# Patient Record
Sex: FEMALE | Race: WHITE | NOT HISPANIC OR LATINO | URBAN - METROPOLITAN AREA
[De-identification: names, ages, dates, MRNs, and addresses within clinical notes are randomized per-mention and may not be internally consistent; named-entity substitution may affect disease eponyms.]

---

## 2024-10-26 ENCOUNTER — OFFICE VISIT (OUTPATIENT)
Dept: URGENT CARE | Age: 39
End: 2024-10-26
Payer: COMMERCIAL

## 2024-10-26 VITALS
DIASTOLIC BLOOD PRESSURE: 88 MMHG | TEMPERATURE: 99.2 F | HEIGHT: 65 IN | SYSTOLIC BLOOD PRESSURE: 121 MMHG | WEIGHT: 141 LBS | BODY MASS INDEX: 23.49 KG/M2 | RESPIRATION RATE: 18 BRPM | HEART RATE: 78 BPM | OXYGEN SATURATION: 98 %

## 2024-10-26 DIAGNOSIS — R51.9 ACUTE NONINTRACTABLE HEADACHE, UNSPECIFIED HEADACHE TYPE: ICD-10-CM

## 2024-10-26 DIAGNOSIS — H81.10 BENIGN PAROXYSMAL POSITIONAL VERTIGO, UNSPECIFIED LATERALITY: Primary | ICD-10-CM

## 2024-10-26 RX ORDER — MECLIZINE HYDROCHLORIDE 25 MG/1
25 TABLET ORAL 3 TIMES DAILY PRN
Qty: 30 TABLET | Refills: 0 | Status: SHIPPED | OUTPATIENT
Start: 2024-10-26 | End: 2025-10-26

## 2024-10-26 RX ORDER — LISDEXAMFETAMINE DIMESYLATE 50 MG/1
1 CAPSULE ORAL
COMMUNITY
Start: 2024-10-10

## 2024-10-26 RX ORDER — DULOXETIN HYDROCHLORIDE 30 MG/1
30 CAPSULE, DELAYED RELEASE ORAL
COMMUNITY
Start: 2021-11-08

## 2024-10-26 ASSESSMENT — PAIN SCALES - GENERAL: PAINLEVEL_OUTOF10: 3

## 2024-10-26 NOTE — PATIENT INSTRUCTIONS
Take medication as directed   Tylenol or ibuprofen as needed for headache  Avoid operating motor vehicle, climbing ladders or doing other activities or dizziness would be detrimental  See PCP in 5 to 7 days if not resolving  Go to ER for high fevers or worsening symptoms

## 2024-10-26 NOTE — PROGRESS NOTES
"Subjective   Patient ID: Torri South is a 39 y.o. female. They present today with a chief complaint of Vertigo.    History of Present Illness  HPI  39-year-old female presents with 5 days of vertigo and headache.  She states that she recently had a upper respiratory cold which seem to be resolving.  No recent head trauma.  She denies any hearing problems or earaches.  No sore throat.  She states the vertigo is elicited with quick movements of her head.  She denies any loss of consciousness.  No vomiting.  No peripheral weakness.  No chest pains or shortness of breath.  Past Medical History  Allergies as of 10/26/2024    (No Known Allergies)       (Not in a hospital admission)       Past Medical History:   Diagnosis Date    Other specified health status     No pertinent past medical history    Otitis media, unspecified, left ear 07/22/2022    Left otitis media       No past surgical history on file.     reports that she has never smoked. She has never used smokeless tobacco. She reports that she does not currently use alcohol.    Review of Systems  Review of Systems as in history of present illness                               Objective    Vitals:    10/26/24 1233   BP: 121/88   Pulse: 78   Resp: 18   Temp: 37.3 °C (99.2 °F)   TempSrc: Oral   SpO2: 98%   Weight: 64 kg (141 lb)   Height: 1.638 m (5' 4.5\")     No LMP recorded.    Physical Exam  General: Vitals noted, no distress. Afebrile.   EENT: TMs clear. Posterior oropharynx unremarkable.  Tympanic membranes and ear canals appear normal with no effusion or erythema.  Eyes: EOMI, PERRL, no nystagmus  Cardiac: Regular, rate, rhythm, no murmur.   Extremities: No peripheral edema. Negative Homans bilaterally, no cords.   Skin: No rash.   Neuro: No focal neurologic deficits, NIH score of 0.    Procedures    Point of Care Test & Imaging Results from this visit  No results found for this visit on 10/26/24.   No results found.    Diagnostic study results (if any) were " reviewed by Kahlil Gonzalez MD.    Assessment/Plan   Allergies, medications, history, and pertinent labs/EKGs/Imaging reviewed by Kahlil Gonzalez MD.     Medical Decision Making  At time of discharge patient was clinically well-appearing and HDS for outpatient management. The patient and/or family was educated regarding diagnosis, supportive care, OTC and Rx medications. The patient and/or family was given the opportunity to ask questions prior to discharge.  They verbalized understanding of my discussion of the plans for treatment, expected course, indications to return to  or seek further evaluation in ED, and the need for timely follow up as directed.   They were provided with a work/school excuse if requested.    Orders and Diagnoses  Diagnoses and all orders for this visit:  Benign paroxysmal positional vertigo, unspecified laterality  -     meclizine (Antivert) 25 mg tablet; Take 1 tablet (25 mg) by mouth 3 times a day as needed for dizziness.  Acute nonintractable headache, unspecified headache type      Medical Admin Record      Patient disposition: Home    Electronically signed by Kahlil Gonzalez MD  12:51 PM

## 2025-02-13 ENCOUNTER — OFFICE VISIT (OUTPATIENT)
Dept: URGENT CARE | Age: 40
End: 2025-02-13
Payer: COMMERCIAL

## 2025-02-13 VITALS
SYSTOLIC BLOOD PRESSURE: 115 MMHG | RESPIRATION RATE: 20 BRPM | HEART RATE: 100 BPM | TEMPERATURE: 97.6 F | DIASTOLIC BLOOD PRESSURE: 74 MMHG | OXYGEN SATURATION: 97 %

## 2025-02-13 DIAGNOSIS — H69.90 EUSTACHIAN TUBE DISORDER, UNSPECIFIED LATERALITY: Primary | ICD-10-CM

## 2025-02-13 RX ORDER — BENZONATATE 200 MG/1
200 CAPSULE ORAL 3 TIMES DAILY PRN
Qty: 21 CAPSULE | Refills: 0 | Status: SHIPPED | OUTPATIENT
Start: 2025-02-13 | End: 2025-03-15

## 2025-02-13 RX ORDER — LORATADINE AND PSEUDOEPHEDRINE SULFATE 5; 120 MG/1; MG/1
1 TABLET, EXTENDED RELEASE ORAL 2 TIMES DAILY
Qty: 20 TABLET | Refills: 0 | Status: SHIPPED | OUTPATIENT
Start: 2025-02-13 | End: 2026-02-13

## 2025-02-13 RX ORDER — AMOXICILLIN AND CLAVULANATE POTASSIUM 875; 125 MG/1; MG/1
875 TABLET, FILM COATED ORAL 2 TIMES DAILY
Qty: 20 TABLET | Refills: 0 | Status: SHIPPED | OUTPATIENT
Start: 2025-02-13

## 2025-02-13 RX ORDER — IPRATROPIUM BROMIDE 42 UG/1
2 SPRAY, METERED NASAL 3 TIMES DAILY
Qty: 15 ML | Refills: 0 | Status: SHIPPED | OUTPATIENT
Start: 2025-02-13 | End: 2025-02-18

## 2025-02-13 ASSESSMENT — ENCOUNTER SYMPTOMS: COUGH: 1

## 2025-02-14 NOTE — PROGRESS NOTES
Subjective   Patient ID: Torri South is a 39 y.o. female. They present today with a chief complaint of Cough (X 10 days) and Earache (X 2 days).    History of Present Illness  39-year-old female here with 7 days of initially URI symptoms for the last 3-5 days increased left ear pain and pressure.  No fever mild chills.  Has been both runny and stuffy.  Some headache.  Occasional cough.  Some nausea but no vomiting diarrhea.  She has been using Advil DayQuil and NyQuil without much relief.  2 of her children been diagnosed with otitis media.      Cough  Associated symptoms include ear pain.   Earache   Associated symptoms include coughing.       Past Medical History  Allergies as of 02/13/2025    (No Known Allergies)       (Not in a hospital admission)       Past Medical History:   Diagnosis Date    Other specified health status     No pertinent past medical history    Otitis media, unspecified, left ear 07/22/2022    Left otitis media       No past surgical history on file.     reports that she has never smoked. She has never used smokeless tobacco. She reports that she does not currently use alcohol.    Review of Systems  Review of Systems   HENT:  Positive for ear pain.    Respiratory:  Positive for cough.                                   Objective    Vitals:    02/13/25 1925   BP: 115/74   Pulse: 100   Resp: 20   Temp: 36.4 °C (97.6 °F)   TempSrc: Temporal   SpO2: 97%     No LMP recorded.    Physical Exam    General- No apparent distress, well appearing  Cardiovascular- regular rate and rhythm, no gallops, murmurs or rubs  Lungs- clear to auscultation bilaterally, no wheezing or rhonchi  ENT- Normal right tympanic membranes, normal pharynx, moderate clear fluid behind left tympanic membrane  Skin- no rashes noted      Procedures    Point of Care Test & Imaging Results from this visit  No results found for this visit on 02/13/25.   No results found.    Diagnostic study results (if any) were reviewed by Rodriguez  EARNEST Maldonado MD.    Assessment/Plan   Allergies, medications, history, and pertinent labs/EKGs/Imaging reviewed by Rodriguez Maldonado MD.     Medical Decision Making  1.  Left otalgia-suspect eustachian tube dysfunction.  Flonase, Claritin-D, Tessalon.  If not improved in 48 hours start Augmentin for possible serous otitis    Orders and Diagnoses  There are no diagnoses linked to this encounter.    Medical Admin Record      Patient disposition: Home    Electronically signed by Rodriguez Maldonado MD  7:37 PM

## 2025-06-28 ENCOUNTER — OFFICE VISIT (OUTPATIENT)
Dept: URGENT CARE | Age: 40
End: 2025-06-28
Payer: COMMERCIAL

## 2025-06-28 VITALS
HEIGHT: 64 IN | HEART RATE: 90 BPM | SYSTOLIC BLOOD PRESSURE: 120 MMHG | RESPIRATION RATE: 19 BRPM | BODY MASS INDEX: 24.59 KG/M2 | DIASTOLIC BLOOD PRESSURE: 84 MMHG | WEIGHT: 144 LBS | TEMPERATURE: 99.6 F | OXYGEN SATURATION: 97 %

## 2025-06-28 DIAGNOSIS — S39.012A LUMBAR STRAIN, INITIAL ENCOUNTER: Primary | ICD-10-CM

## 2025-06-28 PROBLEM — F41.1 GENERALIZED ANXIETY DISORDER: Status: ACTIVE | Noted: 2024-12-05

## 2025-06-28 PROBLEM — F41.9 ANXIETY: Status: ACTIVE | Noted: 2022-07-26

## 2025-06-28 PROBLEM — F33.41 RECURRENT MAJOR DEPRESSIVE DISORDER, IN PARTIAL REMISSION: Status: ACTIVE | Noted: 2024-12-05

## 2025-06-28 PROBLEM — F90.9 ATTENTION DEFICIT HYPERACTIVITY DISORDER (ADHD): Status: ACTIVE | Noted: 2022-07-26

## 2025-06-28 RX ORDER — KETOROLAC TROMETHAMINE 30 MG/ML
30 INJECTION, SOLUTION INTRAMUSCULAR; INTRAVENOUS ONCE
Status: COMPLETED | OUTPATIENT
Start: 2025-06-28 | End: 2025-06-28

## 2025-06-28 RX ORDER — DULOXETIN HYDROCHLORIDE 20 MG/1
1 CAPSULE, DELAYED RELEASE ORAL
COMMUNITY
Start: 2025-04-14

## 2025-06-28 RX ORDER — LISDEXAMFETAMINE DIMESYLATE 60 MG/1
60 CAPSULE ORAL
COMMUNITY
Start: 2025-06-21

## 2025-06-28 RX ORDER — PREDNISONE 20 MG/1
TABLET ORAL
Qty: 10 TABLET | Refills: 0 | Status: SHIPPED | OUTPATIENT
Start: 2025-06-28 | End: 2025-07-07

## 2025-06-28 RX ORDER — PREDNISONE 20 MG/1
TABLET ORAL
Qty: 10 TABLET | Refills: 0 | Status: SHIPPED | OUTPATIENT
Start: 2025-06-28 | End: 2025-06-28 | Stop reason: SDUPTHER

## 2025-06-28 RX ORDER — CYCLOBENZAPRINE HCL 10 MG
5 TABLET ORAL 3 TIMES DAILY
Qty: 20 TABLET | Refills: 0 | Status: SHIPPED | OUTPATIENT
Start: 2025-06-28 | End: 2025-06-28

## 2025-06-28 RX ORDER — CYCLOBENZAPRINE HCL 10 MG
5 TABLET ORAL 3 TIMES DAILY
Qty: 20 TABLET | Refills: 0 | Status: SHIPPED | OUTPATIENT
Start: 2025-06-28 | End: 2025-07-08

## 2025-06-28 RX ADMIN — KETOROLAC TROMETHAMINE 30 MG: 30 INJECTION, SOLUTION INTRAMUSCULAR; INTRAVENOUS at 19:04

## 2025-06-28 ASSESSMENT — PAIN SCALES - GENERAL
PAINLEVEL_OUTOF10: 9
PAINLEVEL_OUTOF10: 7

## 2025-06-28 NOTE — PATIENT INSTRUCTIONS
You were seen today for a flare-up of chronic low back pain with sciatica. A Toradol injection was given in the clinic to help reduce inflammation and provide short-term pain relief.  Medications:  Prednisone: Take as prescribed to reduce inflammation.  Flexeril (Cyclobenzaprine): Take as directed for muscle relaxation, typically at bedtime due to possible drowsiness.  Use over-the-counter ibuprofen or acetaminophen as needed for additional pain control, unless otherwise directed.  Activity:  Rest as needed but avoid prolonged bed rest.  Use gentle stretching and gradual return to activity as tolerated.  Avoid heavy lifting, twisting, or prolonged sitting.  Use heat or ice packs to help manage pain (20 minutes at a time, a few times a day).  Follow-Up:  Continue follow-up with your spine specialist as scheduled.  If symptoms persist or worsen, return to clinic or seek medical attention.  Return If You Develop:  New or worsening numbness, tingling, or weakness in the legs  Difficulty walking or standing  Loss of bowel or bladder control  Severe or worsening pain not relieved by medications

## 2025-06-28 NOTE — PROGRESS NOTES
"Subjective   Patient ID: Torri South is a 40 y.o. female. They present today with a chief complaint of Back Pain.    History of Present Illness  40-year-old female presents with exacerbation of chronic low back pain. Reports history of lumbago since 2004, typically managed with chiropractic care. Currently awaiting evaluation by a spine specialist. This is her third flare in the past 4 months. Today, the pain became constant and severe, located primarily in the left lower back and radiating down the left thigh. She reports worsening with bending and prolonged standing. Took one of her spouse's 5 mg oxycodone earlier today with minimal relief. Denies numbness, tingling, bowel or bladder incontinence, saddle anesthesia, recent trauma, fever, or weakness.        Past Medical History  Allergies as of 06/28/2025    (No Known Allergies)       Prescriptions Prior to Admission[1]     Medical History[2]    Surgical History[3]     reports that she has quit smoking. Her smoking use included cigarettes. She has never used smokeless tobacco. She reports current alcohol use.    Review of Systems  Review of Systems   All other systems reviewed and are negative.  General: No fever, chills, or weight loss.    Musculoskeletal: Positive for low back pain radiating to the left thigh. Denies joint swelling or stiffness elsewhere.    Neurological: Denies numbness, tingling, weakness, or bowel/bladder incontinence.    Cardiovascular/Respiratory: Denies chest pain, palpitations, shortness of breath.    Gastrointestinal: Denies abdominal pain, nausea, vomiting.    Genitourinary: Denies urinary retention or incontinence.    Objective    Vitals:    06/28/25 1833   BP: 123/90   Pulse: 88   Resp: 20   Temp: 37.6 °C (99.6 °F)   TempSrc: Oral   SpO2: 98%   Weight: 65.3 kg (144 lb)   Height: 1.626 m (5' 4\")     No LMP recorded.    Physical Exam  Vitals and nursing note reviewed.     General: Alert, in mild discomfort due to back " pain.    Spine: Tenderness over the left lower lumbar paraspinal muscles. No midline tenderness or step-offs.  Range of Motion: Decreased lumbar flexion and extension due to pain.  Neurological:  Motor: 5/5 strength in bilateral lower extremities.  Sensation: Intact to light touch and pinprick in bilateral lower extremities.  Reflexes: Patellar and Achilles reflexes 2+ and symmetric bilaterally.  Straight Leg Raise: Positive on the left at approximately 40 degrees reproducing radicular pain.  Gait: Normal.  Lower extremities: No edema or deformity.    Procedures    Point of Care Test & Imaging Results from this visit  No results found for this visit on 06/28/25.   Imaging  No results found.    Cardiology, Vascular, and Other Imaging  No other imaging results found for the past 2 days      Diagnostic study results (if any) were reviewed by JOSE EDUARDO Jarrett.    Assessment/Plan   Allergies, medications, history, and pertinent labs/EKGs/Imaging reviewed by JOSE EDUARDO Jarrett.     Medical Decision Making  40-year-old female with a known history of chronic low back pain since 2004 presents with acute exacerbation, describing worsening left-sided low back pain radiating into the left thigh. No red flag symptoms reported. Exam findings and symptom pattern are consistent with lumbar radiculopathy/sciatica in the setting of chronic lumbago. A Toradol injection was administered in clinic for acute pain relief. Patient was prescribed a prednisone taper to reduce inflammation and Flexeril for muscle spasm. Instructions given on activity modification and supportive care. Although imaging was deferred at this time due to absence of neurological deficits or trauma, patient will continue with her pending spine specialist referral for definitive evaluation. Patient verbalized understanding of the treatment plan and was counseled on return precautions including red flag symptoms such as saddle anesthesia,  incontinence, or progressive neurologic deficit. Patient verbalized understanding and agreeable with plan of care.     Orders and Diagnoses  Diagnoses and all orders for this visit:  Lumbar strain, initial encounter  -     ketorolac (Toradol) injection 30 mg  -     cyclobenzaprine (Flexeril) 10 mg tablet; Take 0.5 tablets (5 mg) by mouth 3 times a day for 10 days.  -     predniSONE (Deltasone) 20 mg tablet; Take 3 tablets (60 mg) by mouth once daily for 3 days, THEN 2 tablets (40 mg) once daily for 3 days, THEN 1 tablet (20 mg) once daily for 3 days.  -     predniSONE (Deltasone) 20 mg tablet; Take 3 tablets (60 mg) by mouth once daily for 3 days, THEN 2 tablets (40 mg) once daily for 3 days, THEN 1 tablet (20 mg) once daily for 3 days.      Medical Admin Record  Administrations This Visit       ketorolac (Toradol) injection 30 mg       Admin Date  06/28/2025 Action  Given Dose  30 mg Route  intramuscular Documented By  Kaitlin Ordoñez MA                    Patient disposition: Home    Electronically signed by JOSE EDUARDO Jarrett  7:13 PM           [1] (Not in a hospital admission)   [2]   Past Medical History:  Diagnosis Date    Other specified health status     No pertinent past medical history    Otitis media, unspecified, left ear 07/22/2022    Left otitis media   [3] History reviewed. No pertinent surgical history.

## 2025-07-04 ENCOUNTER — OFFICE VISIT (OUTPATIENT)
Dept: URGENT CARE | Age: 40
End: 2025-07-04
Payer: COMMERCIAL

## 2025-07-04 VITALS
RESPIRATION RATE: 20 BRPM | HEART RATE: 74 BPM | DIASTOLIC BLOOD PRESSURE: 77 MMHG | BODY MASS INDEX: 24.59 KG/M2 | TEMPERATURE: 98.6 F | SYSTOLIC BLOOD PRESSURE: 109 MMHG | HEIGHT: 64 IN | OXYGEN SATURATION: 97 % | WEIGHT: 144 LBS

## 2025-07-04 DIAGNOSIS — K64.9 ACUTE HEMORRHOID: Primary | ICD-10-CM

## 2025-07-04 DIAGNOSIS — N75.0 BARTHOLIN GLAND CYST: ICD-10-CM

## 2025-07-04 RX ORDER — AMOXICILLIN AND CLAVULANATE POTASSIUM 875; 125 MG/1; MG/1
875 TABLET, FILM COATED ORAL 2 TIMES DAILY
Qty: 14 TABLET | Refills: 0 | Status: SHIPPED | OUTPATIENT
Start: 2025-07-04 | End: 2025-07-11

## 2025-07-04 RX ORDER — HYDROCORTISONE ACETATE PRAMOXINE HCL 2.5; 1 G/100G; G/100G
CREAM TOPICAL 2 TIMES DAILY
Qty: 30 G | Refills: 0 | Status: SHIPPED | OUTPATIENT
Start: 2025-07-04 | End: 2025-07-18

## 2025-07-04 NOTE — PATIENT INSTRUCTIONS
Bartholin’s Cyst:  You have a Bartholin’s gland cyst, a fluid-filled swelling near the vaginal opening caused by a blocked gland duct.  Medications:  Augmentin (amoxicillin-clavulanate): Take as prescribed to treat or prevent infection. Complete the full course, even if symptoms improve.  Take ibuprofen or acetaminophen as needed for pain relief.  At-Home Care:  Take warm sitz baths 2-3 times daily for 10-15 minutes to encourage natural drainage and relieve discomfort.  Keep the area clean and dry. Avoid tight-fitting underwear or pants.  Do not attempt to squeeze or drain the cyst yourself.  Follow-Up:  Follow up with Gynecology (GYN) for further evaluation, especially if symptoms worsen or do not improve in 3-5 days.  Seek care promptly if you develop fever, spreading redness, worsening pain, or increased swelling.  Hemorrhoid Care:  You also have symptoms consistent with hemorrhoids.  Medication:  Pramoxine-HC topical cream: Apply as directed to reduce itching, pain, and swelling in the rectal area. Use only on unbroken skin and follow instructions on the label or as prescribed.  Lifestyle Tips:  Increase your fiber intake (whole grains, fruits, vegetables) and drink plenty of water.  Avoid straining during bowel movements.  Use a stool softener if needed.  Avoid sitting for long periods and take breaks to move around.  Referral:  A referral has been made to Gastroenterology to evaluate for possible underlying conditions contributing to hemorrhoid symptoms.

## 2025-07-04 NOTE — PROGRESS NOTES
"Subjective   Patient ID: Torri South is a 40 y.o. female. They present today with a chief complaint of Hemorrhoids (X 6 days).    History of Present Illness  40-year-old female presents with a chronic history of hemorrhoids with a recent flare-up, now associated with pain and itching, but no bleeding. She has been using witch hazel and laxative suppositories with mild relief. Additionally, she reports a new lump on the left side of the vagina, which she describes as nontender and fluctuant. Denies fever, chills, trauma, rectal bleeding, urinary symptoms, or vaginal discharge.          Past Medical History  Allergies as of 07/04/2025    (No Known Allergies)       Prescriptions Prior to Admission[1]     Medical History[2]    Surgical History[3]     reports that she has quit smoking. Her smoking use included cigarettes. She has never used smokeless tobacco. She reports current alcohol use.    Review of Systems  Review of Systems   All other systems reviewed and are negative.    Constitutional: Denies fever, chills, weight loss    Gastrointestinal: Reports itching and pain from hemorrhoids, denies bleeding or constipation    Genitourinary: Reports lump on left side of vagina, denies vaginal discharge, bleeding, or urinary symptoms    Skin: Denies rash or other skin changes    Neurological: Denies numbness or weakness      Objective    Vitals:    07/04/25 1021   BP: 109/77   Pulse: 74   Resp: 20   Temp: 37 °C (98.6 °F)   TempSrc: Oral   SpO2: 97%   Weight: 65.3 kg (144 lb)   Height: 1.626 m (5' 4\")     No LMP recorded.    Physical Exam  Vitals and nursing note reviewed.       General: Alert, in no acute distress    Abdomen: Soft, nondistended, no tenderness    Anorectal: External hemorrhoids visible with mild swelling and erythema, no active bleeding, tender on palpation    Perineum: Left-sided vulvar lump noted, approximately 2 cm, fluctuant, nontender, no overlying skin erythema or warmth    Pelvic: No cervical " motion tenderness, normal vaginal mucosa, no abnormal discharge    Lymph nodes: No inguinal lymphadenopathy  Procedures    Point of Care Test & Imaging Results from this visit  No results found for this visit on 07/04/25.   Imaging  No results found.    Cardiology, Vascular, and Other Imaging  No other imaging results found for the past 2 days      Diagnostic study results (if any) were reviewed by JOSE EDUARDO Jarrett.    Assessment/Plan   Allergies, medications, history, and pertinent labs/EKGs/Imaging reviewed by JOSE EDUARDO Jarrett.     Medical Decision Making  The patient presents with a history of chronic hemorrhoids currently flaring with pain and itching but no bleeding. Symptoms are consistent with a hemorrhoidal flare, likely aggravated by straining or local irritation. Differential diagnosis includes thrombosed hemorrhoid or anal fissure, though no bleeding or severe pain is reported.    Additionally, the patient has a unilateral, fluctuant vulvar mass consistent with a Bartholin’s cyst. Differential diagnoses for the vulvar lump include Bartholin’s cyst versus less likely vulvar abscess or other cystic lesions. There are no overt signs of infection such as erythema, warmth, or tenderness at this time.    Management will focus on symptomatic relief of hemorrhoids with conservative measures and observation of the vulvar cyst. Referral to GI and GYN is advised for further evaluation and potential intervention if symptoms persist or worsen. The patient was counseled regarding signs of infection and instructed to seek prompt care if fever, increased pain, or swelling develop.    Orders and Diagnoses  There are no diagnoses linked to this encounter.    Medical Admin Record      Patient disposition: Home    Electronically signed by JOSE EDUARDO Jarrett  10:30 AM           [1] (Not in a hospital admission)  [2]   Past Medical History:  Diagnosis Date    Other specified health status     No  pertinent past medical history    Otitis media, unspecified, left ear 07/22/2022    Left otitis media   [3] History reviewed. No pertinent surgical history.

## 2025-08-21 ENCOUNTER — OFFICE VISIT (OUTPATIENT)
Dept: URGENT CARE | Age: 40
End: 2025-08-21
Payer: COMMERCIAL

## 2025-08-21 VITALS
SYSTOLIC BLOOD PRESSURE: 123 MMHG | OXYGEN SATURATION: 98 % | DIASTOLIC BLOOD PRESSURE: 88 MMHG | BODY MASS INDEX: 24.72 KG/M2 | RESPIRATION RATE: 15 BRPM | HEART RATE: 82 BPM | WEIGHT: 144 LBS | TEMPERATURE: 98.4 F

## 2025-08-21 DIAGNOSIS — R35.0 URINE FREQUENCY: ICD-10-CM

## 2025-08-21 LAB
POC APPEARANCE, URINE: ABNORMAL
POC BILIRUBIN, URINE: NEGATIVE
POC BLOOD, URINE: NEGATIVE
POC COLOR, URINE: YELLOW
POC GLUCOSE, URINE: NEGATIVE MG/DL
POC KETONES, URINE: NEGATIVE MG/DL
POC LEUKOCYTES, URINE: ABNORMAL
POC NITRITE,URINE: NEGATIVE
POC PH, URINE: 6 PH
POC PROTEIN, URINE: NEGATIVE MG/DL
POC SPECIFIC GRAVITY, URINE: 1.01
POC UROBILINOGEN, URINE: 0.2 EU/DL
PREGNANCY TEST URINE, POC: NEGATIVE

## 2025-08-21 PROCEDURE — 99213 OFFICE O/P EST LOW 20 MIN: CPT | Performed by: FAMILY MEDICINE

## 2025-08-21 PROCEDURE — 81025 URINE PREGNANCY TEST: CPT | Performed by: FAMILY MEDICINE

## 2025-08-21 PROCEDURE — 81003 URINALYSIS AUTO W/O SCOPE: CPT | Performed by: FAMILY MEDICINE

## 2025-08-21 RX ORDER — NITROFURANTOIN 25; 75 MG/1; MG/1
100 CAPSULE ORAL 2 TIMES DAILY
Qty: 6 CAPSULE | Refills: 0 | Status: SHIPPED | OUTPATIENT
Start: 2025-08-21 | End: 2025-08-24

## 2025-08-23 LAB — BACTERIA UR CULT: ABNORMAL
